# Patient Record
Sex: MALE | Race: WHITE | NOT HISPANIC OR LATINO | Employment: OTHER | URBAN - METROPOLITAN AREA
[De-identification: names, ages, dates, MRNs, and addresses within clinical notes are randomized per-mention and may not be internally consistent; named-entity substitution may affect disease eponyms.]

---

## 2023-07-06 ENCOUNTER — PRE VISIT (OUTPATIENT)
Dept: ONCOLOGY | Facility: CLINIC | Age: 68
End: 2023-07-06
Payer: COMMERCIAL

## 2023-07-06 ENCOUNTER — TRANSCRIBE ORDERS (OUTPATIENT)
Dept: OTHER | Age: 68
End: 2023-07-06

## 2023-07-06 DIAGNOSIS — C61 RECURRENT PROSTATE CANCER (H): Primary | ICD-10-CM

## 2023-07-06 NOTE — TELEPHONE ENCOUNTER
Action    Action Taken 7/6/2023 2:10pm AGUILAR   Warm Transfer From [facility] in [facility]. He set up an appt at [place] but he hasn't been seen there yet.     He was originally dx 13 years ago at [facility]. The urologist was part of the [facility] back then. Dr. Gregor Villatoro.     I called [facility] IMG Dept [phone]- I spoke to Stefanie and she will push the images over to  PACS soon!     I called [facility] Path Dept [phone]   [phone] -their phone went to . I left a message requesting a call back.     I called [facility] Medical Records Dept [phone] #3- They asked me to send a request over and then they will look into it. Fax: [phone]    7/6/2023 3:28pm AGUILAR   [facility] Medical Records Dept called back - they have a pathology report 10/6/2010 and the 2011 proctectomy report. They will send the reports to [phone] Attn: Gisela     7/7/2023 12:14pm AGUILAR   I faxed the [facility] path and surgical reports to HIM and the NN Team.

## 2023-07-07 ENCOUNTER — PATIENT OUTREACH (OUTPATIENT)
Dept: ONCOLOGY | Facility: CLINIC | Age: 68
End: 2023-07-07
Payer: COMMERCIAL

## 2023-07-07 NOTE — PROGRESS NOTES
New Patient Oncology Nurse Navigator Note     Referring provider:   Gregor Garcia MD, [facility] UROLOGY      Referred to (specialty): Medical Oncology    Requested provider (if applicable):   Dr. Darby     Date Referral Received:   7/6/23     Evaluation for :   Recurrence of prostate cancer     Clinical History (per Nurse review of records provided):    Per [facility] urology progress notes (5/17/23):  Treated for a [place] 4+3 pathologic T3b N0 M0 adenocarcinoma of the prostate on 1/13/2011. The patient has historically had undetectable PSAs. He was identified as having low level biochemical detectability in 2019. His PSA remained relatively stable in the low detectable range until recently when his PSA was identified at 0.18. We have been following the PSA values with surveillance over time.     PROSTATE BIOPSY (formerly [facility]) 10/2010     PROSTATECTOMY, LAPAROSCOPIC, DA JHON 2011 6/2/23 PET/CT   TECHNIQUE:   RADIOPHARMACEUTICALS: Pylarify 9.8 mCi, IV   IMPRESSION:   1. Post prostatectomy change with no residual or abnormal activity in the   prostate bed however abnormal retroperitoneal, left pelvic sidewall and   perirectal lymph nodes are seen, compatible with oneil metastatic involvement   as described above.     Referral placed to medical oncology       Records Location (Care Everywhere, Media, etc.):   [facility]     I called Delgado, to discuss referral to oncology.  I introduced my role and reviewed what this consult visit will entail/what to expect.  I reviewed the location and gave contact numbers including new patient scheduling and clinic phone numbers.   Delgado, has no other questions at this time.    I forwarded on referral with scheduling instructions for the following   PLAN: HOLD: Dr. Darby, 7/24, 12-1 PM, [facility], [facility], in person    I transferred call to our new patient scheduling to finalize appointment.    Татьяна Perry, RN, BSN  Oncology New Patient Nurse Navigator   [facility]  Care  [phone]

## 2023-07-19 NOTE — TELEPHONE ENCOUNTER
Recurrent prostate cancer (H) [C61]    Action Taken  Date/Description  TJ     Phone July 19, 2023  4:04 PM   LVM for [facility] Pathology to CB and verify if we can still get the slides since they are over 10 years old    July 20, 2023 10:45 AM  CB from [facility] stating they do not keep slides beyond 10 years so there are none available.     Imaging Received  July 19, 2023    3:59 PM  ROSARIO   Action: Images from [facility] received and resolved to PACS.

## 2023-07-23 NOTE — PROGRESS NOTES
[facility] Medical Oncology Note       Date of visit: July 24, 2023  New Outpatient Clinic Note        Assessment:     Recurrent, and now metastatic, castrate sensitive (presumably) prostate cancer, 12-1/2 years out from the time of Delgado's radical prostatectomy.  This is not what we would call oligometastatic disease, and so we cannot offer him any therapy now for cure.  Therefore, the goals of his treatment are to help him live as long as possible, while at the same time have the highest quality of life.  Therapy should only be offered to, or accepted by, Delgado if it can help him with either of these two goals.    In terms of living longer, his disease is too widespread on his PSMA scanning to offer radiotherapy to active sites of disease.  This is not, unfortunately, oligometastatic prostate cancer.  Therefore, as above, we cannot go for cure.  This was discussed honestly and at length today. Given the minimal amount of cancer, it is tough to argue that treating it now will help him live longer than if we waited until his arbitrarily PSA hit 5 or 10.   The next question is: Is there anything we can do to help his quality of life?  And of course the answer is no.  He is very active and very healthy.  Right now he is asymptomatic from the cancer.  He does a lot of physical activity and it is very important to him.  Going on androgen deprivation now would only decrease his muscle mass and cause fatigue.  He really has just minimal asymptomatic cancer at this point, and the truth is any therapy that we would give him would have a significant chance of having side effects.  Therefore, my recommendation is to take a deep breath and continue observation.  If his PSA begins to rise at a faster clip, we could consider bicalutamide or short course of androgen deprivation.  We will just have to see how things go.  Otherwise a very healthy and youthful 68-year-old man.        Plan:     I recommend no intervention  currently.  Continue to follow-up with Dr. Villatoro  If the patient would like, I can see him as well.  I would see him again in 4 months, which would be 6 months after his most recent PSA, with another PSA at that point.          Jesse Darby MD, MSc  Associate Professor of Medicine  [facility] [employer]  [facility]  [address] [place] [address]  [phone]    __________________________________________________________________    CHIEF COMPLAINT     I have been asked to see this patient in consultation by Dr. Villatoro to give an opinion regarding further evaluation and management of a rising PSA, 12 years after radical prostatectomy for a stage III ([id number])[place] 4+3 equal 7 prostate cancer.    History of Present Illness/Therapy to date:     Radical prostatectomy 1/19/2011: Final pathology showed a 2.2 mm Ricky 4+3 involving posterior margin alongside left SV. EPE(-). +LSV involvement. 2nodes (1 each on left and right) negative  Stage III [id number] .  Apparently, Delgado had a PSA in the 5-6 range prior to getting a prostate biopsy.  PSA became measurable at 0.18 in 2019. Has been observed in the interim. Most recent PSA is 0.33, 5/17/2023.  However, his PSA 6 months ago was also 0.18.  Pylarify/PET scan from 6/2/2023 shows abnormal retroperitoneal, left pelvic sidewall, and perirectal lymph nodes with PSMA uptake, consistent with recurrent prostate cancer.      Interval history:     Delgado is here with wife Mar  Feels very well.  Has a wonderful quality of life.  He is very physically active and does a lot of things to help out friends.  He is proud of his athletic abilities.  He has minimal urinary leakage.  He is equivocal about whether or not he has erections adequate for sex.  He denies fevers, chills, sweats, nausea, vomiting, diarrhea, constipation, chest pain, or shortness of breath.  He has no new lumps or bumps.    On ROS in addition to the  "above      Denies  fevers, chills, NS, HA, dysphagia/odynophagia, change in weight, change in appetite, cough, SOB, CP, n/v, abd pain, constipation/diarrhea, hematochezia, dysuria, hematuria, swelling, rashes, lymphadenopathy      Past Medical History:    Allergic rhinitis    Dysphonia 2021    Eczema    HTN (hypertension)    Hyperlipidemia    Hypokalemia    Impotence, organic    Prostate cancer (HCC) 2011   Adenocarcinoma Ricky score 3 + 3    Trigger finger, left ring finger   SCOA     No past medical history on file.       Past Surgical History:    I have reviewed this patient's past surgical history       Social History:   Tobacco, ETOH, and rec drugs reviewed and as noted below with the following exceptions:  Delgado was born in [place] and graduated from high school in .  He then went to the [employer] where he was an athlete who played both football and wrestling.  He met his wife Mar there. He apparently called out to her from behind his dorm windon \"Dav[employer]t\" on many occasions (her last name was Samson).  Inexplicably, she did not think this was a deal breaker.  They  shortly thereafter.  They had 4 biologic children, 3 of whom are still alive.  Daughter Berenice had multiple medical issues and  at the age of 15.  Stephen is  42, daughter Paula is a 38.  There is 7 grandchildren.  After a long career working for [employer] as a , he retired at the age of 64.  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  He loves being physically active.  He spends a lot of time outdoors.  Ashanti is a retired .        Family History:     No family history on file.   DVT in brother and mother      Medications:     No current outpatient medications on file.              Physical Exam:   There were no vitals taken for this visit.    ECOG PS: 0  Constitutional: WDWN male in NAD, pleasant and appropriate.  " He looks much younger than his stated age.  HEENT:  NC/AT, no icterus, OP clear, MMM  Skin: No jaundice nor ecchymoses  Lungs: CTAB, no w/r/r, nonlabored breathing  Cardiovascular: RRR, S1, S2, no m/r/g  Abdomen: +BS, soft, nontender, nondistended, no organomegaly nor masses  MSK/Extremities: Warm, well perfused. No edema  LN: no cervical, supraclavicular, axillary, nor inguinal lymphadenopathy  Neurologic: alert, answering questions appropriately, moving all extremities spontaneously. CN 2-12 grossly intact.  Psych: appropriate affect  Data:     omponent 05/17/23 11/17/22 05/11/22 03/07/22 02/05/21 07/30/20   PSA 0.33 0.18 0.10 0.18 0.09 0.08         Sodium 136 - 146 mmol/L 138    Potassium 3.5 - 5.1 mmol/L 3.7    Chloride 98 - 107 mmol/L 104    CO2 22 - 29 mmol/L 25    Anion Gap 10.0 - 20.0 mmol/L 12.7    Creatinine 0.72 - 1.25 mg/dL 0.80    Blood Urea Nitrogen 10.0 - 20.0 mg/dL 13.5    BUN/Creatinine Ratio 11.70 - 22.90 ratio 16.88    Calcium 8.6 - 10.5 mg/dL 9.6    Glucose 70 - 100 mg/dL 104 High     eGFR >=60 mL/min/1.73m(2) >60        No results found for this or any previous visit (from the past 24 hour(s)).    Other Data     6/2/23 PET/CT   TECHNIQUE:   RADIOPHARMACEUTICALS: Pylarify 9.8 mCi, IV   IMPRESSION:   1. Post prostatectomy change with no residual or abnormal activity in the   prostate bed however abnormal retroperitoneal, left pelvic sidewall and   perirectal lymph nodes are seen, compatible with oneil metastatic involvement   as described above.     Referral placed to medical oncology        Labs, imaging and treatment plan reviewed with patient. All questions answered.        70 minutes spent on the date of the encounter doing chart review, review of outside records, review of test results, interpretation of tests, patient visit, documentation, discussion with other provider(s), and discussion with family

## 2023-07-24 ENCOUNTER — ONCOLOGY VISIT (OUTPATIENT)
Dept: ONCOLOGY | Facility: CLINIC | Age: 68
End: 2023-07-24
Attending: UROLOGY
Payer: COMMERCIAL

## 2023-07-24 VITALS
SYSTOLIC BLOOD PRESSURE: 129 MMHG | WEIGHT: 236.6 LBS | HEIGHT: 68 IN | RESPIRATION RATE: 16 BRPM | HEART RATE: 76 BPM | TEMPERATURE: 97.7 F | OXYGEN SATURATION: 96 % | DIASTOLIC BLOOD PRESSURE: 72 MMHG | BODY MASS INDEX: 35.86 KG/M2

## 2023-07-24 DIAGNOSIS — C61 RECURRENT PROSTATE CANCER (H): ICD-10-CM

## 2023-07-24 PROCEDURE — 99205 OFFICE O/P NEW HI 60 MIN: CPT | Performed by: INTERNAL MEDICINE

## 2023-07-24 PROCEDURE — G0463 HOSPITAL OUTPT CLINIC VISIT: HCPCS | Performed by: INTERNAL MEDICINE

## 2023-07-24 RX ORDER — SPIRONOLACTONE 25 MG/1
25 TABLET ORAL EVERY MORNING
COMMUNITY

## 2023-07-24 RX ORDER — LOSARTAN POTASSIUM 50 MG/1
50 TABLET ORAL EVERY MORNING
COMMUNITY

## 2023-07-24 RX ORDER — FEXOFENADINE HCL 180 MG/1
180 TABLET ORAL
COMMUNITY

## 2023-07-24 ASSESSMENT — PAIN SCALES - GENERAL: PAINLEVEL: NO PAIN (0)

## 2023-07-24 NOTE — LETTER
7/24/2023         RE: Delgado Alcantar  [address] [facility] [address]        Dear Colleague,    Thank you for referring your patient, Delgado Alcantar, to the [facility]. Please see a copy of my visit note below.      [facility] Medical Oncology Note       Date of visit: July 24, 2023  New Outpatient Clinic Note        Assessment:     Recurrent, and now metastatic, castrate sensitive (presumably) prostate cancer, 12-1/2 years out from the time of Delgado's radical prostatectomy.  This is not what we would call oligometastatic disease, and so we cannot offer him any therapy now for cure.  Therefore, the goals of his treatment are to help him live as long as possible, while at the same time have the highest quality of life.  Therapy should only be offered to, or accepted by, Delgado if it can help him with either of these two goals.    In terms of living longer, his disease is too widespread on his PSMA scanning to offer radiotherapy to active sites of disease.  This is not, unfortunately, oligometastatic prostate cancer.  Therefore, as above, we cannot go for cure.  This was discussed honestly and at length today. Given the minimal amount of cancer, it is tough to argue that treating it now will help him live longer than if we waited until his arbitrarily PSA hit 5 or 10.   The next question is: Is there anything we can do to help his quality of life?  And of course the answer is no.  He is very active and very healthy.  Right now he is asymptomatic from the cancer.  He does a lot of physical activity and it is very important to him.  Going on androgen deprivation now would only decrease his muscle mass and cause fatigue.  He really has just minimal asymptomatic cancer at this point, and the truth is any therapy that we would give him would have a significant chance of having side effects.  Therefore, my recommendation is to take a deep breath and continue observation.  If his  PSA begins to rise at a faster clip, we could consider bicalutamide or short course of androgen deprivation.  We will just have to see how things go.  Otherwise a very healthy and youthful 68-year-old man.        Plan:     I recommend no intervention currently.  Continue to follow-up with Dr. Villatoro  If the patient would like, I can see him as well.  I would see him again in 4 months, which would be 6 months after his most recent PSA, with another PSA at that point.          Jesse Darby MD, MSc  Associate Professor of Medicine  [facility] [place] [address]  [phone]    __________________________________________________________________    CHIEF COMPLAINT     I have been asked to see this patient in consultation by Dr. Villatoro to give an opinion regarding further evaluation and management of a rising PSA, 12 years after radical prostatectomy for a stage III ([id number])[place] 4+3 equal 7 prostate cancer.    History of Present Illness/Therapy to date:     Radical prostatectomy 1/19/2011: Final pathology showed a 2.2 mm [place] 4+3 involving posterior margin alongside left SV. EPE(-). +LSV involvement. 2nodes (1 each on left and right) negative  Stage III [id number] .  Apparently, Delgado had a PSA in the 5-6 range prior to getting a prostate biopsy.  PSA became measurable at 0.18 in 2019. Has been observed in the interim. Most recent PSA is 0.33, 5/17/2023.  However, his PSA 6 months ago was also 0.18.  Pylarify/PET scan from 6/2/2023 shows abnormal retroperitoneal, left pelvic sidewall, and perirectal lymph nodes with PSMA uptake, consistent with recurrent prostate cancer.      Interval history:     Delgado is here with wife Mar  Feels very well.  Has a wonderful quality of life.  He is very physically active and does a lot of things to help out friends.  He is proud of his athletic abilities.  He has minimal urinary leakage.  He is  "equivocal about whether or not he has erections adequate for sex.  He denies fevers, chills, sweats, nausea, vomiting, diarrhea, constipation, chest pain, or shortness of breath.  He has no new lumps or bumps.    On ROS in addition to the above      Denies  fevers, chills, NS, HA, dysphagia/odynophagia, change in weight, change in appetite, cough, SOB, CP, n/v, abd pain, constipation/diarrhea, hematochezia, dysuria, hematuria, swelling, rashes, lymphadenopathy      Past Medical History:    Allergic rhinitis    Dysphonia 2021    Eczema    HTN (hypertension)    Hyperlipidemia    Hypokalemia    Impotence, organic    Prostate cancer (HCC) 2011   Adenocarcinoma Ricky score 3 + 3    Trigger finger, left ring finger   SCOA     No past medical history on file.       Past Surgical History:    I have reviewed this patient's past surgical history       Social History:   Tobacco, ETOH, and rec drugs reviewed and as noted below with the following exceptions:  Delgado was born in [place] and graduated from high school in .  He then went to the [employer] where he was an athlete who played both football and wrestling.  He met his wife Mar there. He apparently called out to her from behind his dorm windon \"Dav[employer]t\" on many occasions (her last name was Samson).  Inexplicably, she did not think this was a deal breaker.  They  shortly thereafter.  They had 4 biologic children, 3 of whom are still alive.  Daughter Berenice had multiple medical issues and  at the age of 15.  Stephen is  42, daughter Paula is a 38.  There is 7 grandchildren.  After a long career working for [employer] as a , he retired at the age of 64.  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  He loves being physically active.  He spends a lot of time outdoors.  Ashanti is a retired .        Family History:     No family history on file. "   DVT in brother and mother      Medications:     No current outpatient medications on file.              Physical Exam:   There were no vitals taken for this visit.    ECOG PS: 0  Constitutional: WDWN male in NAD, pleasant and appropriate.  He looks much younger than his stated age.  HEENT:  NC/AT, no icterus, OP clear, MMM  Skin: No jaundice nor ecchymoses  Lungs: CTAB, no w/r/r, nonlabored breathing  Cardiovascular: RRR, S1, S2, no m/r/g  Abdomen: +BS, soft, nontender, nondistended, no organomegaly nor masses  MSK/Extremities: Warm, well perfused. No edema  LN: no cervical, supraclavicular, axillary, nor inguinal lymphadenopathy  Neurologic: alert, answering questions appropriately, moving all extremities spontaneously. CN 2-12 grossly intact.  Psych: appropriate affect  Data:     omponent 05/17/23 11/17/22 05/11/22 03/07/22 02/05/21 07/30/20   PSA 0.33 0.18 0.10 0.18 0.09 0.08         Sodium 136 - 146 mmol/L 138    Potassium 3.5 - 5.1 mmol/L 3.7    Chloride 98 - 107 mmol/L 104    CO2 22 - 29 mmol/L 25    Anion Gap 10.0 - 20.0 mmol/L 12.7    Creatinine 0.72 - 1.25 mg/dL 0.80    Blood Urea Nitrogen 10.0 - 20.0 mg/dL 13.5    BUN/Creatinine Ratio 11.70 - 22.90 ratio 16.88    Calcium 8.6 - 10.5 mg/dL 9.6    Glucose 70 - 100 mg/dL 104 High     eGFR >=60 mL/min/1.73m(2) >60        No results found for this or any previous visit (from the past 24 hour(s)).    Other Data     6/2/23 PET/CT   TECHNIQUE:   RADIOPHARMACEUTICALS: Pylarify 9.8 mCi, IV   IMPRESSION:   1. Post prostatectomy change with no residual or abnormal activity in the   prostate bed however abnormal retroperitoneal, left pelvic sidewall and   perirectal lymph nodes are seen, compatible with oneil metastatic involvement   as described above.     Referral placed to medical oncology        Labs, imaging and treatment plan reviewed with patient. All questions answered.        70 minutes spent on the date of the encounter doing chart review, review of outside  records, review of test results, interpretation of tests, patient visit, documentation, discussion with other provider(s), and discussion with family         Jesse Darby MD

## 2023-07-24 NOTE — NURSING NOTE
"Oncology Rooming Note    July 24, 2023 12:21 PM   Delgado Alcantar is a 68 year old male who presents for:    Chief Complaint   Patient presents with     Oncology Clinic Visit     Recurrent prostate cancer      Initial Vitals: /72 (BP Location: Right arm, Patient Position: Sitting, Cuff Size: Adult Regular)   Pulse 76   Temp 97.7  F (36.5  C) (Oral)   Resp 16   Ht 1.73 m (5' 8.11\")   Wt 107.3 kg (236 lb 9.6 oz)   SpO2 96%   BMI 35.86 kg/m   Estimated body mass index is 35.86 kg/m  as calculated from the following:    Height as of this encounter: 1.73 m (5' 8.11\").    Weight as of this encounter: 107.3 kg (236 lb 9.6 oz). Body surface area is 2.27 meters squared.  No Pain (0) Comment: Data Unavailable   No LMP for male patient.  Allergies reviewed: Yes  Medications reviewed: Yes    Medications: Medication refills not needed today.  Pharmacy name entered into EPIC: Data Unavailable    Clinical concerns: New patient consult for recurrent prostate cancer.        Leo Dickson              "

## 2024-05-19 ENCOUNTER — HEALTH MAINTENANCE LETTER (OUTPATIENT)
Age: 69
End: 2024-05-19

## 2025-06-08 ENCOUNTER — HEALTH MAINTENANCE LETTER (OUTPATIENT)
Age: 70
End: 2025-06-08